# Patient Record
Sex: MALE | URBAN - METROPOLITAN AREA
[De-identification: names, ages, dates, MRNs, and addresses within clinical notes are randomized per-mention and may not be internally consistent; named-entity substitution may affect disease eponyms.]

---

## 2018-08-02 ENCOUNTER — OFFICE VISIT (OUTPATIENT)
Dept: URBAN - METROPOLITAN AREA CLINIC 33 | Facility: CLINIC | Age: 28
End: 2018-08-02
Payer: COMMERCIAL

## 2018-08-02 DIAGNOSIS — H52.13 MYOPIA, BILATERAL: Primary | ICD-10-CM

## 2018-08-02 PROCEDURE — 92015 DETERMINE REFRACTIVE STATE: CPT | Performed by: OPTOMETRIST

## 2018-08-02 PROCEDURE — 92004 COMPRE OPH EXAM NEW PT 1/>: CPT | Performed by: OPTOMETRIST

## 2018-08-02 ASSESSMENT — VISUAL ACUITY
OD: 20/80
OS: 20/50

## 2018-08-02 ASSESSMENT — INTRAOCULAR PRESSURE
OS: 15
OD: 16

## 2018-08-02 NOTE — IMPRESSION/PLAN
Impression: Myopia, bilateral: H52.13. Plan: Educated patient about today's findings. Order refraction to determine patient's best corrected visual acuity as it relates to myopia- dispensed Rx to patient. Patient was educated about 1-2 week adaptation period with new Rx.